# Patient Record
Sex: FEMALE | Race: WHITE | NOT HISPANIC OR LATINO
[De-identification: names, ages, dates, MRNs, and addresses within clinical notes are randomized per-mention and may not be internally consistent; named-entity substitution may affect disease eponyms.]

---

## 2021-03-29 PROBLEM — Z00.00 ENCOUNTER FOR PREVENTIVE HEALTH EXAMINATION: Status: ACTIVE | Noted: 2021-03-29

## 2021-05-07 ENCOUNTER — TRANSCRIPTION ENCOUNTER (OUTPATIENT)
Age: 31
End: 2021-05-07

## 2021-05-26 ENCOUNTER — APPOINTMENT (OUTPATIENT)
Dept: BREAST CENTER | Facility: CLINIC | Age: 31
End: 2021-05-26
Payer: COMMERCIAL

## 2021-05-26 VITALS
TEMPERATURE: 98 F | WEIGHT: 140 LBS | SYSTOLIC BLOOD PRESSURE: 122 MMHG | DIASTOLIC BLOOD PRESSURE: 80 MMHG | BODY MASS INDEX: 23.9 KG/M2 | HEIGHT: 64 IN

## 2021-05-26 DIAGNOSIS — Z86.59 PERSONAL HISTORY OF OTHER MENTAL AND BEHAVIORAL DISORDERS: ICD-10-CM

## 2021-05-26 DIAGNOSIS — Z78.9 OTHER SPECIFIED HEALTH STATUS: ICD-10-CM

## 2021-05-26 PROCEDURE — 99203 OFFICE O/P NEW LOW 30 MIN: CPT

## 2021-05-26 RX ORDER — SERTRALINE HYDROCHLORIDE 25 MG/1
TABLET, FILM COATED ORAL
Refills: 0 | Status: ACTIVE | COMMUNITY

## 2021-05-26 NOTE — HISTORY OF PRESENT ILLNESS
[FreeTextEntry1] : Georgia is a 31 F with R breast pain and a BIRADS 3 abnormality. \par \par She had a baby in January of last year.  She did not breastfeed at that time.  However, she has started to develop R breast pain that is constant, however it is more painful during her periods.  She has not palpated any abnormal masses within either breast and denies any nipple discharge or retraction. \par \par Her most recent imaging was a b/l dx mammogram and US on 2021 which revealed 2 adjacent circumscribed ovoid densities in her R breast, and on US @12N7, she had a group of cysts measuring up to 6 mm and a 0.7 cm ovoid hypoechoic lesion likely either cyst with debris vs. possible fibroadenoma, deemed BIRADS 3. \par \par HISTORICAL RISK FACTORS: \par -no prior breast biopsies or surgeries \par -no family history of breast or ovarian cancer \par -, age at first live birth was 29 \par -prior OCP use x 10 years, stopped in  \par -no gyn surgeries\par

## 2021-05-26 NOTE — REVIEW OF SYSTEMS
[As Noted in HPI] : as noted in HPI [Breast Pain] : breast pain [Negative] : Heme/Lymph [Skin Lesions] : no skin lesions [Skin Wound] : no skin wound [Breast Lump] : no breast lump

## 2021-05-26 NOTE — PAST MEDICAL HISTORY
[Menstruating] : The patient is menstruating [Menarche Age ____] : age at menarche was [unfilled] [History of Hormone Replacement Treatment] : has no history of hormone replacement treatment [Definite ___ (Date)] : the last menstrual period was [unfilled] [Total Preg ___] : G[unfilled] [Live Births ___] : P[unfilled]  [Age At Live Birth ___] : Age at live birth: [unfilled] [FreeTextEntry5] : denies  [FreeTextEntry6] : denies [FreeTextEntry7] : x 10 years in past, stopped in 2017  [FreeTextEntry8] : denies

## 2021-05-26 NOTE — DATA REVIEWED
[FreeTextEntry1] : \par Procedure(s) \par Accession Number(s)\par Date of Service\par MAMMO DIAGNOSTIC BILATERAL\par US BREAST\par [id number]\par [id number]\par 1/7/21\par 1/7/21\par  \par  \par  \par     \par OVERALL IMPRESSION:\par  \par There is a 0.7 cm hypoechoic lesion, likely representing a debris filled cyst or perhaps a fibroadenoma. Six-month follow-up targeted right breast ultrasound is recommended and the patient was so advised\par  \par There is no mammographic or sonographic evidence of malignancy in the left breast.\par  \par As further detailed above, a 6 month follow-up DIAGNOSTIC imaging exam of the right breast(s) is recommended. A letter will be sent to the patient to return for follow up.\par  \par The findings and recommendations were discussed with the patient.\par  \par BI-RADS 3: PROBABLY BENIGN\par  \par  \par FINDINGS:\par  \par MAMMO TOMOSYNTHESIS DIAGNOSTIC BILATERAL, US BREAST\par  \par Clinical Breast Exam: Patient does report clinical breast exam in the last year.\par  \par Clinical Indication: Routine screening. No family history of breast cancer. Patient complains of occasional bilateral breast pain.\par  \par Baseline mammogram\par  \par MAMMOGRAM: \par  \par Tomosynthesis and 2D imaging of the breast(s) were performed.  Current study was also evaluated with a computer aided detection (CAD) system.\par  \par Breast Density: Heterogeneously dense, which may obscure small masses.\par  \par No significant masses, calcifications, or other findings are seen in the left breast. \par  \par Examination of the right breast reveals 2 adjacent circumscribed ovoid densities in the posterior 12:00 location\par  \par US BREAST COMPLETE BILATERAL\par  \par Ultrasound evaluation was performed including examination of all four quadrants of the breast(s) and the retroareolar regions.\par  \par Color flow, Gray scale and real-time ultrasound of both breasts was performed. \par  \par No suspicious abnormalities were seen sonographically in the left breast. \par  \par Examination of the right breast reveals grouped cysts in the 12:00 location at 7 cm from the nipple. This would include approximately three 0.6 cm simple cysts. \par  \par There is also a 0.7 cm ovoid hypoechoic lesion, having the appearance of a debris-filled cyst or possibly a fibroadenoma. Six-month follow-up of this finding is recommended. The findings and recommendations were discussed with the patient by the dictating radiologist\par  \par  \par  \par Electronic Signature: I personally reviewed the images and agree with this report. Final Report: Dictated by  and Signed by Attending Carin Johnston MD 1/7/2021 12:29 PM

## 2021-05-26 NOTE — PHYSICAL EXAM
[Normocephalic] : normocephalic [Atraumatic] : atraumatic [EOMI] : extra ocular movement intact [No Supraclavicular Adenopathy] : no supraclavicular adenopathy [No Cervical Adenopathy] : no cervical adenopathy [No dominant masses] : no dominant masses in right breast  [No dominant masses] : no dominant masses left breast [No Nipple Retraction] : no left nipple retraction [No Nipple Discharge] : no left nipple discharge [No Axillary Lymphadenopathy] : no left axillary lymphadenopathy [Soft] : abdomen soft [Not Tender] : non-tender [No Edema] : no edema [No Rashes] : no rashes [No Ulceration] : no ulceration [Examined in the supine and seated position] : examined in the supine and seated position [de-identified] : no suspicious abnormalities palpated within either breast, she does have occasional subcm nodularities palpated throughout both breasts, however

## 2021-05-26 NOTE — ASSESSMENT
[FreeTextEntry1] : Georgia is a 31 F with a R breast BIRADS 3 lesion and breast pain. \par \par On exam, she had occasional b/l subcm nodularities, but no suspicious masses were palpated within either breast. \par \par She had a b/l dx mammogram and US on 1/7/2021 which revealed in her right breast @12N7, a group of three cysts measuring upt o 6 mm, and a 0.7 cm hypoechoic ovoid lesions, likely a cyst with debris vs. fibroadenoma, deemed BIRADS 3. \par \par She will be due for a R dx mammogram and R breast US on 7/7/2021.  This will be scheduled for her today.  If unrevealing, she can follow up after her repeat R breast US which will be due on 1/7/2022.  \par \par We also discussed BIRADS 3 lesions.  These lesions have a 2% chance of harboring malignancy.  There is always an option to obtain a tissue sample with a biopsy to confirm the diagnosis.   The procedure was described in detail including the placement of a tissue marker clip.  The risks of the procedure, including but not limited to bleeding and infection were also explained.  She is not interested in biopsy at this time and agrees to continue with short-term interval imaging.\par \par We discussed breast cysts. They are not pre-malignant nor do they have malignant potential and are hormonally influenced.  They may grow or shrink in size as well as resolve spontaneously and there is usually no intervention unless they are symptomatic.  In several large studies, patients with breast cysts and a positive family history had a higher relative risk of breast cancer (from 1.5 to 3).  She is asymptomatic from her right breast cyst so no intervention will be performed at this time.\par \par In regards to her breast pain, it may be related to fibrocystic changes within her breast that are hormonally influenced. We spoke about possible interventions including evening primrose oil, supportive bras, and decreasing caffeine intake.  Although none of these have been consistently proven to improve breast pain, they may be tried.  If the pain becomes very severe, there have been studies of tamoxifen being effective for the treatment of breast pain, although there are risks with tamoxifen.  At this time she will try supportive measures.\par \par She is otherwise at an average risk for breast cancer and should start annual screening mammograms at the age of 39. \par \par All of her questions were answered.  She knows to call with any further questions or concerns. \par \par PLAN: \par -R dx mammogram and breast US on 7/7/2021\par -if unrevealing, f/up after R breast US due on 1/7/2022

## 2021-07-19 ENCOUNTER — NON-APPOINTMENT (OUTPATIENT)
Age: 31
End: 2021-07-19

## 2021-07-29 ENCOUNTER — EMERGENCY (EMERGENCY)
Facility: HOSPITAL | Age: 31
LOS: 0 days | Discharge: HOME | End: 2021-07-29
Attending: EMERGENCY MEDICINE | Admitting: EMERGENCY MEDICINE
Payer: COMMERCIAL

## 2021-07-29 VITALS
SYSTOLIC BLOOD PRESSURE: 115 MMHG | OXYGEN SATURATION: 99 % | HEART RATE: 71 BPM | DIASTOLIC BLOOD PRESSURE: 70 MMHG | RESPIRATION RATE: 18 BRPM | TEMPERATURE: 98 F

## 2021-07-29 VITALS
RESPIRATION RATE: 18 BRPM | WEIGHT: 139.99 LBS | HEART RATE: 66 BPM | DIASTOLIC BLOOD PRESSURE: 65 MMHG | HEIGHT: 64 IN | OXYGEN SATURATION: 98 % | SYSTOLIC BLOOD PRESSURE: 102 MMHG | TEMPERATURE: 99 F

## 2021-07-29 DIAGNOSIS — Z79.899 OTHER LONG TERM (CURRENT) DRUG THERAPY: ICD-10-CM

## 2021-07-29 DIAGNOSIS — R07.89 OTHER CHEST PAIN: ICD-10-CM

## 2021-07-29 DIAGNOSIS — R10.12 LEFT UPPER QUADRANT PAIN: ICD-10-CM

## 2021-07-29 LAB
ALBUMIN SERPL ELPH-MCNC: 4.8 G/DL — SIGNIFICANT CHANGE UP (ref 3.5–5.2)
ALP SERPL-CCNC: 64 U/L — SIGNIFICANT CHANGE UP (ref 30–115)
ALT FLD-CCNC: 11 U/L — SIGNIFICANT CHANGE UP (ref 0–41)
ANION GAP SERPL CALC-SCNC: 13 MMOL/L — SIGNIFICANT CHANGE UP (ref 7–14)
AST SERPL-CCNC: 20 U/L — SIGNIFICANT CHANGE UP (ref 0–41)
BASOPHILS # BLD AUTO: 0.08 K/UL — SIGNIFICANT CHANGE UP (ref 0–0.2)
BASOPHILS NFR BLD AUTO: 0.9 % — SIGNIFICANT CHANGE UP (ref 0–1)
BILIRUB SERPL-MCNC: 1.1 MG/DL — SIGNIFICANT CHANGE UP (ref 0.2–1.2)
BUN SERPL-MCNC: 14 MG/DL — SIGNIFICANT CHANGE UP (ref 10–20)
CALCIUM SERPL-MCNC: 10.2 MG/DL — HIGH (ref 8.5–10.1)
CHLORIDE SERPL-SCNC: 101 MMOL/L — SIGNIFICANT CHANGE UP (ref 98–110)
CO2 SERPL-SCNC: 22 MMOL/L — SIGNIFICANT CHANGE UP (ref 17–32)
CREAT SERPL-MCNC: 0.9 MG/DL — SIGNIFICANT CHANGE UP (ref 0.7–1.5)
EOSINOPHIL # BLD AUTO: 0.13 K/UL — SIGNIFICANT CHANGE UP (ref 0–0.7)
EOSINOPHIL NFR BLD AUTO: 1.5 % — SIGNIFICANT CHANGE UP (ref 0–8)
GLUCOSE SERPL-MCNC: 93 MG/DL — SIGNIFICANT CHANGE UP (ref 70–99)
HCG SERPL QL: NEGATIVE — SIGNIFICANT CHANGE UP
HCT VFR BLD CALC: 38.7 % — SIGNIFICANT CHANGE UP (ref 37–47)
HGB BLD-MCNC: 13.2 G/DL — SIGNIFICANT CHANGE UP (ref 12–16)
IMM GRANULOCYTES NFR BLD AUTO: 0.4 % — HIGH (ref 0.1–0.3)
LIDOCAIN IGE QN: 51 U/L — SIGNIFICANT CHANGE UP (ref 7–60)
LYMPHOCYTES # BLD AUTO: 2.18 K/UL — SIGNIFICANT CHANGE UP (ref 1.2–3.4)
LYMPHOCYTES # BLD AUTO: 25.5 % — SIGNIFICANT CHANGE UP (ref 20.5–51.1)
MAGNESIUM SERPL-MCNC: 2.4 MG/DL — SIGNIFICANT CHANGE UP (ref 1.8–2.4)
MCHC RBC-ENTMCNC: 30.1 PG — SIGNIFICANT CHANGE UP (ref 27–31)
MCHC RBC-ENTMCNC: 34.1 G/DL — SIGNIFICANT CHANGE UP (ref 32–37)
MCV RBC AUTO: 88.2 FL — SIGNIFICANT CHANGE UP (ref 81–99)
MONOCYTES # BLD AUTO: 0.4 K/UL — SIGNIFICANT CHANGE UP (ref 0.1–0.6)
MONOCYTES NFR BLD AUTO: 4.7 % — SIGNIFICANT CHANGE UP (ref 1.7–9.3)
NEUTROPHILS # BLD AUTO: 5.74 K/UL — SIGNIFICANT CHANGE UP (ref 1.4–6.5)
NEUTROPHILS NFR BLD AUTO: 67 % — SIGNIFICANT CHANGE UP (ref 42.2–75.2)
NRBC # BLD: 0 /100 WBCS — SIGNIFICANT CHANGE UP (ref 0–0)
NT-PROBNP SERPL-SCNC: 30 PG/ML — SIGNIFICANT CHANGE UP (ref 0–300)
PLATELET # BLD AUTO: 433 K/UL — HIGH (ref 130–400)
POTASSIUM SERPL-MCNC: 4.1 MMOL/L — SIGNIFICANT CHANGE UP (ref 3.5–5)
POTASSIUM SERPL-SCNC: 4.1 MMOL/L — SIGNIFICANT CHANGE UP (ref 3.5–5)
PROT SERPL-MCNC: 6.7 G/DL — SIGNIFICANT CHANGE UP (ref 6–8)
RBC # BLD: 4.39 M/UL — SIGNIFICANT CHANGE UP (ref 4.2–5.4)
RBC # FLD: 11.9 % — SIGNIFICANT CHANGE UP (ref 11.5–14.5)
SODIUM SERPL-SCNC: 136 MMOL/L — SIGNIFICANT CHANGE UP (ref 135–146)
TROPONIN T SERPL-MCNC: <0.01 NG/ML — SIGNIFICANT CHANGE UP
WBC # BLD: 8.56 K/UL — SIGNIFICANT CHANGE UP (ref 4.8–10.8)
WBC # FLD AUTO: 8.56 K/UL — SIGNIFICANT CHANGE UP (ref 4.8–10.8)

## 2021-07-29 PROCEDURE — 71046 X-RAY EXAM CHEST 2 VIEWS: CPT | Mod: 26

## 2021-07-29 PROCEDURE — 99285 EMERGENCY DEPT VISIT HI MDM: CPT

## 2021-07-29 PROCEDURE — 93010 ELECTROCARDIOGRAM REPORT: CPT | Mod: NC

## 2021-07-29 RX ORDER — KETOROLAC TROMETHAMINE 30 MG/ML
30 SYRINGE (ML) INJECTION ONCE
Refills: 0 | Status: DISCONTINUED | OUTPATIENT
Start: 2021-07-29 | End: 2021-07-29

## 2021-07-29 RX ORDER — SERTRALINE 25 MG/1
0 TABLET, FILM COATED ORAL
Qty: 0 | Refills: 0 | DISCHARGE

## 2021-07-29 RX ADMIN — Medication 30 MILLIGRAM(S): at 17:33

## 2021-07-29 NOTE — ED PROVIDER NOTE - PATIENT PORTAL LINK FT
You can access the FollowMyHealth Patient Portal offered by [facility] by registering at the following website: [email or web address]. By joining [employer]’s FollowMyHealth portal, you will also be able to view your health information using other applications (apps) compatible with our system.

## 2021-07-29 NOTE — ED ADULT TRIAGE NOTE - SPO2 (%)
Called and spoke with patient for about 30 minuets. The patient want to get a new PCP at [facility] out patient clinic. I provide the number for her. Patient contacted regarding Geraldine Mendez. Discussed COVID-19 related testing which was not done at this time. Test results were not done. Patient informed of results, if available? N/A    Care Transition Nurse/ Ambulatory Care Manager contacted the patient by telephone to perform post discharge assessment. Verified name and  with patient as identifiers. Provided introduction to self, and explanation of the CTN/ACM role, and reason for call due to risk factors for infection and/or exposure to COVID-19. Symptoms reviewed with patient who verbalized the following symptoms: fatigue and shortness of breath. Due to no new or worsening symptoms encounter was not routed to provider for escalation. Discussed follow-up appointments. If no appointment was previously scheduled, appointment scheduling offered: Yes. Provided number for [facility] out patient clinic  [facility] follow up appointment(s): No future appointments. Non-[facility] follow up appointment(s): N/A     Patient has following risk factors of: diabetes. CTN/ACM reviewed discharge instructions, medical action plan and red flags such as increased shortness of breath, increasing fever and signs of decompensation with patient who verbalized understanding. Discussed exposure protocols and quarantine with CDC Guidelines What to do if you are sick with coronavirus disease .  Patient was given an opportunity for questions and concerns. The patient agrees to contact the [employer] exposure line [phone], local [facility] department [place] Department of [facility]: ([phone]) and PCP office for questions related to their healthcare. CTN/ACM provided contact information for future needs.     Reviewed and educated patient on any new and changed medications related to discharge diagnosis     Patient/family/caregiver given information for [employer] Loop and agrees to enroll no  Patient's preferred e-mail:    Patient's preferred phone number:   Based on Loop alert triggers, patient will be contacted by nurse care manager for worsening symptoms. Plan for follow-up call in 5-7 days based on severity of symptoms and risk factors. 98

## 2021-07-29 NOTE — ED PROVIDER NOTE - OBJECTIVE STATEMENT
Pt is a 30y/o female presents today for eval of left upper quadrant abd discomfort x 4 days that has been intermittent, worsened at night and when lifting up her daughter. Pt sts discomfort radiated to her left chest yesterday which prompted her visit to Urgent care, who then sent her to ED for further eval.  Pt denies fever, chills, weakness, numbness, CP, SOB.

## 2021-07-29 NOTE — ED PROVIDER NOTE - CARE PROVIDER_API CALL
Ana Cristina De Leon  INTERNAL MEDICINE  [address] [place] [address]  Phone: [phone]  Fax: [phone]  Follow Up Time:

## 2021-07-29 NOTE — ED PROVIDER NOTE - NS ED ROS FT
Eyes:  No visual changes, eye pain or discharge.  ENMT:  No hearing changes, pain, discharge or infections. No neck pain or stiffness.  Cardiac:  No chest pain, SOB or edema. No chest pain with exertion.  Respiratory:  No cough or respiratory distress. No hemoptysis. No history of asthma or RAD.  GI:  + abd discomfort No nausea, vomiting, diarrhea or abdominal pain.  :  No dysuria, frequency or burning.  MS:  No myalgia, muscle weakness, joint pain or back pain.  Neuro:  No headache or weakness.  No LOC.  Skin:  No skin rash.   Endocrine: No history of thyroid disease or diabetes.  Except as documented in the HPI,  all other systems are negative.

## 2021-07-29 NOTE — ED ADULT TRIAGE NOTE - CHIEF COMPLAINT QUOTE
Patient reports sent in by urgent care- has been experiencing left sided chest pain and pain "under" left rib for about 5 days. Patient reports she had the first dose of COVID vaccine 2 weeks ago.

## 2021-07-29 NOTE — ED PROVIDER NOTE - ATTENDING CONTRIBUTION TO CARE
31y female no cardiac or PE RF with left lower chest/LUQ pain, PE sa above, labs and studies reviewed, will d/c to f/u with pmd. Patient counseled regarding conditions which should prompt return.

## 2021-08-01 ENCOUNTER — TRANSCRIPTION ENCOUNTER (OUTPATIENT)
Age: 31
End: 2021-08-01

## 2021-08-03 ENCOUNTER — NON-APPOINTMENT (OUTPATIENT)
Age: 31
End: 2021-08-03

## 2022-01-06 ENCOUNTER — APPOINTMENT (OUTPATIENT)
Dept: BREAST CENTER | Facility: CLINIC | Age: 32
End: 2022-01-06
Payer: COMMERCIAL

## 2022-02-03 ENCOUNTER — APPOINTMENT (OUTPATIENT)
Dept: BREAST CENTER | Facility: CLINIC | Age: 32
End: 2022-02-03
Payer: COMMERCIAL

## 2022-04-25 ENCOUNTER — APPOINTMENT (OUTPATIENT)
Dept: BREAST CENTER | Facility: CLINIC | Age: 32
End: 2022-04-25
Payer: COMMERCIAL

## 2022-04-25 ENCOUNTER — NON-APPOINTMENT (OUTPATIENT)
Age: 32
End: 2022-04-25

## 2022-04-25 VITALS
SYSTOLIC BLOOD PRESSURE: 100 MMHG | TEMPERATURE: 97.2 F | BODY MASS INDEX: 23.9 KG/M2 | WEIGHT: 140 LBS | HEIGHT: 64 IN | DIASTOLIC BLOOD PRESSURE: 67 MMHG

## 2022-04-25 DIAGNOSIS — R92.8 OTHER ABNORMAL AND INCONCLUSIVE FINDINGS ON DIAGNOSTIC IMAGING OF BREAST: ICD-10-CM

## 2022-04-25 PROBLEM — F32.9 MAJOR DEPRESSIVE DISORDER, SINGLE EPISODE, UNSPECIFIED: Chronic | Status: ACTIVE | Noted: 2021-07-29

## 2022-04-25 PROCEDURE — 99212 OFFICE O/P EST SF 10 MIN: CPT

## 2022-04-25 NOTE — HISTORY OF PRESENT ILLNESS
[FreeTextEntry1] : Georgia is a 31 F with R breast pain and a BIRADS 3 abnormality. \par \par She had a baby in January of last year.  She did not breastfeed at that time.  However, she has started to develop R breast pain that is constant, however it is more painful during her periods.  She has not palpated any abnormal masses within either breast and denies any nipple discharge or retraction. \par \par Her most recent imaging was a b/l dx mammogram and US on 2021 which revealed 2 adjacent circumscribed ovoid densities in her R breast, and on US @12N7, she had a group of cysts measuring up to 6 mm and a 0.7 cm ovoid hypoechoic lesion likely either cyst with debris vs. possible fibroadenoma, deemed BIRADS 3. \par \par HISTORICAL RISK FACTORS: \par -no prior breast biopsies or surgeries \par -fam hx of mother with DCIS at age 61\par -, age at first live birth was 29 \par -prior OCP use x 10 years, stopped in  \par -no gyn surgeries\par \par \par INTERVAL HISTORY 22\par Georgia is a 31 F with R breast pain and a BIRADS 3 abnormality here for her 6 months follow up\par She has no breast related complaints at this time.  She denies any breast pain, has not palpated any new palpable masses in either breast and denies any nipple discharge or retraction.\par \par Her imaging is as follows:\par 22 b/l dx mammo and US\par -Breast Density: Heterogeneously dense\par RIGHT US;\par -@12N7 0.5 cm stable oval hypoechoic lesion possibly a deep cyst-->ultrasound-guided core biopsy\par BIRADS4 \par \par 22\par US BREAST CYST ASPIRATION RIGHT-->Successful cyst aspiration of the right breast to resolution\par \par \par RISK ASSESSMENT: \par TC v6	\par 5yr --0.5 %\par lifetime -- 20.8%\par

## 2022-04-25 NOTE — PHYSICAL EXAM
[Normocephalic] : normocephalic [Atraumatic] : atraumatic [EOMI] : extra ocular movement intact [Examined in the supine and seated position] : examined in the supine and seated position [Symmetrical] : symmetrical [No dominant masses] : no dominant masses in right breast  [No dominant masses] : no dominant masses left breast [No Nipple Retraction] : no left nipple retraction [No Nipple Discharge] : no left nipple discharge [No Axillary Lymphadenopathy] : no left axillary lymphadenopathy [No Edema] : no edema [No Rashes] : no rashes [No Ulceration] : no ulceration [de-identified] : no suspicious abnormalities were palpated although she does have bilateral nondiscrete nodularities throughout both breasts\par

## 2022-04-25 NOTE — DATA REVIEWED
[FreeTextEntry1] :  \par Jan 4, 2022   \par \par MAMMO TOMOSYNTHESIS DIAGNOSTIC BILATERAL, US BREAST COMPLETE RIGHT\par \par Clinical Breast Exam: Patient does report clinical breast exam in the last year.\par Clinical Indication: Routine screening. Family history of mother with breast cancer. (accession [id number]), Clinical Indication: Patient in for follow up sono. (accession [id number])\par \par  \par \par Compared to: 01/07/2021 (accession [id number]), Compared to: 07/15/2021 and 01/07/2021 (accession [id number])\par \par  \par \par MAMMOGRAM: \par Tomosynthesis and 2D imaging of the breast(s) were performed.  Current study was also evaluated with a computer aided detection (CAD) system.\par Breast Density: Heterogeneously dense, which may obscure small masses.\par No significant masses, calcifications, or other findings are seen in either breast.\par \par US BREAST COMPLETE RIGHT\par \par  \par \par Ultrasound evaluation was performed including examination of all four quadrants of the breast(s) and the retroareolar regions.\par Color flow, Gray scale and real-time ultrasound of the right breast was performed. \par \par There are occasional tiny simple cysts in the right breast, all subcentimeter in size.\par \par There is a 0.5 cm stable oval hypoechoic lesion in the 12:00 location at 7 cm from the nipple, possibly a deep cyst. Further assessment with ultrasound-guided core biopsy is recommended and the patient was so advised\par OVERALL IMPRESSION:\par Stable 0.5 cm oval hypoechoic lesion in the 12:00 right breast at 7 cm from the nipple, possibly a cyst. Further assessment with ultrasound-guided core biopsy is recommended and the patient was so advised.\par There is no mammographic evidence of malignancy in the left breast.\par Biopsy of the right breast(s) is recommended. A letter will be sent to the patient to return for a biopsy.\par The findings and recommendations were discussed with the patient.\par BI-RADS 4: SUSPICIOUS \par \par Jan 18, 2022  \par US BREAST CYST ASPIRATION RIGHT\par \par History: Ultrasound of the right breast dated 1/4/2022 revealed a cyst at 12:00 o'clock 7 cm from the nipple which was recommended for aspiration. \par Benefits, risks and alternatives to the procedure were explained to the patient and informed consent was obtained. The patient denied taking aspirin or anticoagulants and stated no allergies to local anesthetic. \par \par  \par \par Utilizing universal protocol, site and patient verification was performed prior to starting the procedure.  \par Using ultrasound guidance, local anesthesia and aseptic technique, aspiration was performed using an 16 gauge needle. The cyst resolved entirely yielding yellow colored fluid, which was discarded. The patient tolerated the procedure without complications.\par  \par \par IMPRESSION: \par Successful cyst aspiration of the right breast to resolution. \par

## 2022-04-25 NOTE — PAST MEDICAL HISTORY
[History of Hormone Replacement Treatment] : has no history of hormone replacement treatment [FreeTextEntry5] : denies  [FreeTextEntry6] : denies [FreeTextEntry7] : x 10 years in past, stopped in 2017  [FreeTextEntry8] : denies  no

## 2022-04-25 NOTE — ASSESSMENT
[FreeTextEntry1] : Georgia is a 31 F with a R breast BIRADS 3 lesion and breast pain. \par \par On exam, she had occasional b/l subcm nodularities, but no suspicious masses were palpated within either breast. \par \par \par Her imaging is as follows:\par 1/4/22 b/l dx mammo and US\par -Breast Density: Heterogeneously dense\par RIGHT US;\par -@12N7 0.5 cm stable oval hypoechoic lesion possibly a deep cyst-->ultrasound-guided core biopsy\par BIRADS4 \par \par 1/18/22\par US BREAST CYST ASPIRATION RIGHT-->Successful cyst aspiration of the right breast to resolution\par \par We also discussed BIRADS 3 lesions.  These lesions have a 2% chance of harboring malignancy.  There is always an option to obtain a tissue sample with a biopsy to confirm the diagnosis.   The procedure was described in detail including the placement of a tissue marker clip.  The risks of the procedure, including but not limited to bleeding and infection were also explained.  She is not interested in biopsy at this time and agrees to continue with short-term interval imaging.\par \par We discussed breast cysts. They are not pre-malignant nor do they have malignant potential and are hormonally influenced.  They may grow or shrink in size as well as resolve spontaneously and there is usually no intervention unless they are symptomatic.  In several large studies, patients with breast cysts and a positive family history had a higher relative risk of breast cancer (from 1.5 to 3).  She is asymptomatic from her right breast cyst so no intervention will be performed at this time.\par \par In regards to her breast pain, it may be related to fibrocystic changes within her breast that are hormonally influenced. We spoke about possible interventions including evening primrose oil, supportive bras, and decreasing caffeine intake.  Although none of these have been consistently proven to improve breast pain, they may be tried.  If the pain becomes very severe, there have been studies of tamoxifen being effective for the treatment of breast pain, although there are risks with tamoxifen.  At this time she will try supportive measures.\par \par She is otherwise at an average risk for breast cancer and should start annual screening mammograms at the age of 39. \par \par All of her questions were answered.  She knows to call with any further questions or concerns. \par \par PLAN: \par -B/L breast US on 7/5/23\par -follow up after \par -if above is unrevealing she can follow up after yearly for clinical breast exam and start her imaging at age 39

## 2022-09-02 ENCOUNTER — APPOINTMENT (OUTPATIENT)
Dept: BREAST CENTER | Facility: CLINIC | Age: 32
End: 2022-09-02

## 2022-09-02 DIAGNOSIS — Z80.42 FAMILY HISTORY OF MALIGNANT NEOPLASM OF PROSTATE: ICD-10-CM

## 2022-09-02 PROCEDURE — 99212 OFFICE O/P EST SF 10 MIN: CPT

## 2022-09-02 NOTE — REASON FOR VISIT
[Follow-Up: _____] : a [unfilled] follow-up visit [FreeTextEntry1] : fibrocystic breast changes; family hx of breast cancer.

## 2022-09-02 NOTE — DATA REVIEWED
[FreeTextEntry1] : B/L Breast Sono - 07/12/2022:\par No suspicious abnormalities were seen sonographically. \par  \par There is a 0.8 cm oval simple cyst in the upper outer quadrant right breast. The previously noted 0.5 cm cyst in the 12:00 right breast, is no longer demonstrated.\par  \par IMPRESSION: \par  \par There is no sonographic evidence of malignancy.\par  \par A 1 year screening ultrasound of both breast(s) is recommended. \par  \par The patient will be sent a normal letter.\par  \par BI-RADS 2: BENIGN

## 2022-09-02 NOTE — PHYSICAL EXAM
[Normocephalic] : normocephalic [Atraumatic] : atraumatic [No Supraclavicular Adenopathy] : no supraclavicular adenopathy [No dominant masses] : no dominant masses in right breast  [No dominant masses] : no dominant masses left breast [No Nipple Discharge] : no left nipple discharge [No Rashes] : no rashes [No Ulceration] : no ulceration [Breast Nipple Inversion] : nipples not inverted [Breast Nipple Retraction] : nipples not retracted [de-identified] : No suspicious palpable findings; B/L scattered nonspecific nodularity. [de-identified] : No axillary lymphadenopathy appreciated. [de-identified] : No axillary lymphadenopathy appreciated.

## 2022-09-02 NOTE — REVIEW OF SYSTEMS
[Negative] : Constitutional [Breast Pain] : no breast pain [Breast Lump] : no breast lump [Nipple Discharge] : no nipple discharge [Nipple Inverted] : no inversion of the nipple [FreeTextEntry8] : (+

## 2022-09-02 NOTE — HISTORY OF PRESENT ILLNESS
[FreeTextEntry1] : Georgia is a 31 F with R breast pain and a BIRADS 3 abnormality. \par \par She had a baby in January of last year.  She did not breastfeed at that time.  However, she has started to develop R breast pain that is constant, however it is more painful during her periods.  She has not palpated any abnormal masses within either breast and denies any nipple discharge or retraction. \par \par Her most recent imaging was a b/l dx mammogram and US on 2021 which revealed 2 adjacent circumscribed ovoid densities in her R breast, and on US @12N7, she had a group of cysts measuring up to 6 mm and a 0.7 cm ovoid hypoechoic lesion likely either cyst with debris vs. possible fibroadenoma, deemed BIRADS 3. \par \par HISTORICAL RISK FACTORS: \par -no prior breast biopsies or surgeries \par -fam hx of mother with DCIS at age 61\par -, age at first live birth was 29 \par -prior OCP use x 10 years, stopped in  \par -no gyn surgeries\par \par \par INTERVAL HISTORY 22\par Georgia is a 31 F with R breast pain and a BIRADS 3 abnormality here for her 6 months follow up\par She has no breast related complaints at this time.  She denies any breast pain, has not palpated any new palpable masses in either breast and denies any nipple discharge or retraction.\par \par Her imaging is as follows:\par 22 b/l dx mammo and US\par -Breast Density: Heterogeneously dense\par RIGHT US;\par -@12N7 0.5 cm stable oval hypoechoic lesion possibly a deep cyst-->ultrasound-guided core biopsy\par BIRADS4 \par \par 22\par US BREAST CYST ASPIRATION RIGHT-->Successful cyst aspiration of the right breast to resolution\par \par RISK ASSESSMENT: \par TC v6	\par 5yr --0.5 %\par lifetime -- 20.8%\par \par 2022 --\par SERA PRO is a 32 year old female patient who presents today in follow up for a follow-up visit. She is following up for fibrocystic breast changes; family hx of breast cancer.\par Since her last visit, she has no new breast related complaints. \par She is currently 15 weeks pregnant.\par \par Most recent imaging:\par B/L Breast Sono - 2022:\par -There is a 0.8 cm oval simple cyst in the upper outer quadrant right breast. \par -The previously noted 0.5 cm cyst in the 12:00 right breast, is no longer demonstrated.\par -There is no sonographic evidence of malignancy.\par BI-RADS 2: BENIGN\par \par She presents today for evaluation and imaging review.\par \par

## 2022-09-02 NOTE — PAST MEDICAL HISTORY
[Menstruating] : The patient is menstruating [Menarche Age ____] : age at menarche was [unfilled] [Definite ___ (Date)] : the last menstrual period was [unfilled] [Total Preg ___] : G[unfilled] [Live Births ___] : P[unfilled]  [Age At Live Birth ___] : Age at live birth: [unfilled] [History of Hormone Replacement Treatment] : has no history of hormone replacement treatment [FreeTextEntry5] : denies  [FreeTextEntry6] : denies [FreeTextEntry7] : x 10 years in past, stopped in 2017  [FreeTextEntry8] : denies

## 2022-09-02 NOTE — ASSESSMENT
[FreeTextEntry1] : SERA PRO is a 32 year old female patient who presents today in follow up for a follow-up visit. She is following up for fibrocystic breast changes; family hx of breast cancer.\par Since her last visit, she has no new breast related complaints. \par She is currently 15 weeks pregnant.\par \par Most recent imaging:\par B/L Breast Sono - 07/12/2022:\par -There is a 0.8 cm oval simple cyst in the upper outer quadrant right breast. \par -The previously noted 0.5 cm cyst in the 12:00 right breast, is no longer demonstrated.\par -There is no sonographic evidence of malignancy.\par BI-RADS 2: BENIGN\par \par On physical exam, she had occasional b/l subcm nodularities, but no suspicious masses were palpated within either breast. \par \par \par ---\par We also discussed BIRADS 3 lesions.  These lesions have a 2% chance of harboring malignancy.  There is always an option to obtain a tissue sample with a biopsy to confirm the diagnosis.   The procedure was described in detail including the placement of a tissue marker clip.  The risks of the procedure, including but not limited to bleeding and infection were also explained.  She is not interested in biopsy at this time and agrees to continue with short-term interval imaging.\par \par We discussed breast cysts. They are not pre-malignant nor do they have malignant potential and are hormonally influenced.  They may grow or shrink in size as well as resolve spontaneously and there is usually no intervention unless they are symptomatic.  In several large studies, patients with breast cysts and a positive family history had a higher relative risk of breast cancer (from 1.5 to 3).  She is asymptomatic from her right breast cyst so no intervention will be performed at this time.\par \par In regards to her breast pain, it may be related to fibrocystic changes within her breast that are hormonally influenced. We spoke about possible interventions including evening primrose oil, supportive bras, and decreasing caffeine intake.  Although none of these have been consistently proven to improve breast pain, they may be tried.  If the pain becomes very severe, there have been studies of tamoxifen being effective for the treatment of breast pain, although there are risks with tamoxifen.  At this time she will try supportive measures.\par \par I spent a total of 15 minutes of face to face time with this patient, greater than 50% of which was spent in counseling and/or coordination of care.\par All of her questions were appropriately answered.\par She knows to call with any concerns.\par \par \par PLAN: \par -B/L Screening Mammo & Sono - March 2023.\par -follow up after

## 2023-10-06 ENCOUNTER — APPOINTMENT (OUTPATIENT)
Dept: BREAST CENTER | Facility: CLINIC | Age: 33
End: 2023-10-06
Payer: COMMERCIAL

## 2023-10-06 VITALS
SYSTOLIC BLOOD PRESSURE: 120 MMHG | BODY MASS INDEX: 25.61 KG/M2 | WEIGHT: 150 LBS | HEIGHT: 64 IN | DIASTOLIC BLOOD PRESSURE: 79 MMHG

## 2023-10-06 DIAGNOSIS — Z80.3 FAMILY HISTORY OF MALIGNANT NEOPLASM OF BREAST: ICD-10-CM

## 2023-10-06 DIAGNOSIS — N64.4 MASTODYNIA: ICD-10-CM

## 2023-10-06 DIAGNOSIS — N60.12 DIFFUSE CYSTIC MASTOPATHY OF RIGHT BREAST: ICD-10-CM

## 2023-10-06 DIAGNOSIS — N60.11 DIFFUSE CYSTIC MASTOPATHY OF RIGHT BREAST: ICD-10-CM

## 2023-10-06 PROCEDURE — 99213 OFFICE O/P EST LOW 20 MIN: CPT
